# Patient Record
Sex: MALE | Race: WHITE | NOT HISPANIC OR LATINO | Employment: FULL TIME | URBAN - METROPOLITAN AREA
[De-identification: names, ages, dates, MRNs, and addresses within clinical notes are randomized per-mention and may not be internally consistent; named-entity substitution may affect disease eponyms.]

---

## 2024-10-14 ENCOUNTER — OFFICE VISIT (OUTPATIENT)
Dept: CARDIOLOGY | Facility: CLINIC | Age: 24
End: 2024-10-14
Payer: COMMERCIAL

## 2024-10-14 VITALS
BODY MASS INDEX: 18.49 KG/M2 | HEIGHT: 68 IN | WEIGHT: 122 LBS | DIASTOLIC BLOOD PRESSURE: 80 MMHG | HEART RATE: 72 BPM | SYSTOLIC BLOOD PRESSURE: 120 MMHG

## 2024-10-14 DIAGNOSIS — R94.31 RIGHT AXIS DEVIATION: Primary | ICD-10-CM

## 2024-10-14 PROCEDURE — 93000 ELECTROCARDIOGRAM COMPLETE: CPT | Performed by: INTERNAL MEDICINE

## 2024-10-14 PROCEDURE — 99204 OFFICE O/P NEW MOD 45 MIN: CPT | Performed by: INTERNAL MEDICINE

## 2024-10-14 RX ORDER — DESVENLAFAXINE 50 MG/1
1 TABLET, FILM COATED, EXTENDED RELEASE ORAL DAILY
COMMUNITY
Start: 2024-10-02

## 2024-10-14 NOTE — PROGRESS NOTES
[facility]      Patient Name: Jason Lemons  :2000  Age: 23 y.o.  Encounter Provider:  Shaquille Orta Jr, MD      Chief Complaint:   Chief Complaint   Patient presents with    Abnormal ECG         HPI  Jason Lemons is a 23 y.o. male no past cardiac history presents for evaluation prior to starting on stimulant therapy for ADHD.  Patient has excellent functional capacity with no limiting symptoms.  He denies angina.  No orthopnea, PND or edema.  No palpitations, dizziness or syncope.  He has been on several medications for ADHD in the past but this is the first time that he is evaluated at [facility].  EKG shows borderline right axis deviation.  Patient has no childhood history of cardiac disease.  No family history of premature coronary artery disease or sudden cardiac death.  He is a lifelong non-smoker who drinks socially.  No cardiac complaints at time of interview.      The following portions of the patient's history were reviewed and updated as appropriate: allergies, current medications, past family history, past medical history, past social history, past surgical history and problem list.      Review of Systems   Constitutional: Negative for chills and fever.   HENT:  Negative for hoarse voice and sore throat.    Eyes:  Negative for double vision and photophobia.   Cardiovascular:  Negative for chest pain, leg swelling, near-syncope, orthopnea, palpitations, paroxysmal nocturnal dyspnea and syncope.   Respiratory:  Negative for cough and wheezing.    Skin:  Negative for poor wound healing and rash.   Musculoskeletal:  Negative for arthritis and joint swelling.   Gastrointestinal:  Negative for bloating, abdominal pain, hematemesis and hematochezia.   Neurological:  Negative for dizziness and focal weakness.   Psychiatric/Behavioral:  Negative for depression and suicidal ideas.      OBJECTIVE:   Vital Signs  Vitals:    10/14/24 1421   BP: 120/80   Pulse: 72  "    Estimated body mass index is 18.55 kg/m² as calculated from the following:    Height as of this encounter: 172.7 cm (68\").    Weight as of this encounter: 55.3 kg (122 lb).    Vitals reviewed.   Constitutional:       Appearance: Healthy appearance. Not in distress.   Neck:      Vascular: No JVR. JVD normal.   Pulmonary:      Effort: Pulmonary effort is normal.      Breath sounds: Normal breath sounds. No wheezing. No rhonchi. No rales.   Chest:      Chest wall: Not tender to palpatation.   Cardiovascular:      PMI at left midclavicular line. Normal rate. Regular rhythm. Normal S1. Normal S2.       Murmurs: There is no murmur.      No gallop.  No click. No rub.   Pulses:     Intact distal pulses.   Edema:     Peripheral edema absent.   Abdominal:      General: Bowel sounds are normal.      Palpations: Abdomen is soft.      Tenderness: There is no abdominal tenderness.   Musculoskeletal: Normal range of motion.         General: No tenderness. Skin:     General: Skin is warm and dry.   Neurological:      General: No focal deficit present.      Mental Status: Alert and oriented to person, place and time.       ECG 12 Lead    Date/Time: 10/14/2024 2:27 PM  Performed by: Shaquille Orta Jr., MD    Authorized by: Shaquille Orta Jr., MD  Comparison: not compared with previous ECG   Previous ECG: no previous ECG available  Rhythm: sinus rhythm  QRS axis: right    Clinical impression: non-specific ECG           No results found for: \"BUN\", \"CREATININE\", \"K\", \"ALT\", \"AST\"        ASSESSMENT:     23-year-old male with no past cardiac history presents for evaluation of right axis deviation      PLAN OF CARE:     Borderline axis deviation in a young gentleman with no family history and excellent functional capacity.  No indication for further cardiac testing.  He has been on stimulant therapy in the past and noticed some mild palpitations when initiating therapy but no clinical complaints that would be consistent with sustained " arrhythmia.  There is no cardiac contraindication to going back on therapy.  Will see the patient as needed.  Please call with any questions or concerns.             Discharge Medications            Accurate as of October 14, 2024  2:27 PM. If you have any questions, ask your nurse or doctor.                Continue These Medications        Instructions Start Date   desvenlafaxine 50 MG 24 hr tablet  Commonly known as: PRISTIQ   1 tablet, Daily               Thank you for allowing me to participate in the care of your patient,      Sincerely,   Shaquille Orta MD  [facility]  10/14/24  14:27 EDT